# Patient Record
Sex: MALE | Race: WHITE | NOT HISPANIC OR LATINO | Employment: OTHER | URBAN - METROPOLITAN AREA
[De-identification: names, ages, dates, MRNs, and addresses within clinical notes are randomized per-mention and may not be internally consistent; named-entity substitution may affect disease eponyms.]

---

## 2017-09-07 PROBLEM — E11.9 NON-INSULIN DEPENDENT TYPE 2 DIABETES MELLITUS: Chronic | Status: ACTIVE | Noted: 2017-09-07

## 2018-05-02 PROBLEM — E55.9 VITAMIN D DEFICIENCY: Chronic | Status: ACTIVE | Noted: 2018-05-02

## 2018-05-16 PROBLEM — Z98.890 S/P COLONOSCOPY: Chronic | Status: ACTIVE | Noted: 2018-05-16

## 2018-10-02 ENCOUNTER — OFFICE VISIT (OUTPATIENT)
Dept: ORTHOPEDICS | Facility: CLINIC | Age: 78
End: 2018-10-02
Payer: MEDICARE

## 2018-10-02 ENCOUNTER — TELEPHONE (OUTPATIENT)
Dept: ORTHOPEDICS | Facility: CLINIC | Age: 78
End: 2018-10-02

## 2018-10-02 VITALS — WEIGHT: 168 LBS | BODY MASS INDEX: 24.88 KG/M2 | HEIGHT: 69 IN

## 2018-10-02 DIAGNOSIS — M17.0 PRIMARY OSTEOARTHRITIS OF BOTH KNEES: Primary | ICD-10-CM

## 2018-10-02 PROCEDURE — 73564 X-RAY EXAM KNEE 4 OR MORE: CPT | Mod: 50,FY,, | Performed by: ORTHOPAEDIC SURGERY

## 2018-10-02 PROCEDURE — 99214 OFFICE O/P EST MOD 30 MIN: CPT | Mod: 25,,, | Performed by: ORTHOPAEDIC SURGERY

## 2018-10-02 NOTE — PROGRESS NOTES
Subjective:       Chief Complaint    Chief Complaint   Patient presents with    Knee Pain     Bilateral Knee pain x 1 year. Pt states that he has had knee pain for many years when he played football. He states that it started getting progressively worse approximately 1 year ago. Pt had Supartz injection in Left knee 3/3/2015 Pt states that he had Suparts injection in [place] knees. Pt states that Supartz worked well for him. He would like to repeat the injection.       HPI  Kiran Stroud is a 78 y.o.  male who presents with bilateral primary osteoarthritis in both knees. Had a patellectomy in the left knee done by Dr. Dakota Dorado. He has has Supartz in the past, which she states helps quite a bit. Would like to restart that again.      Past History  Past Medical History:   Diagnosis Date    Cancer     Diabetes mellitus type II     HTN (hypertension)     Hyperlipidemia     Hypothyroidism     Renal insufficiency     Thyroid disease      Past Surgical History:   Procedure Laterality Date    APPENDECTOMY      CHILDHOOD    CHOLECYSTECTOMY      TOURO    KNEE CARTILAGE SURGERY      BILATERAL    PROSTATE REMOVED  1995    CANCER     Social History     Socioeconomic History    Marital status:      Spouse name: Not on file    Number of children: Not on file    Years of education: Not on file    Highest education level: Not on file   Social Needs    Financial resource strain: Not on file    Food insecurity - worry: Not on file    Food insecurity - inability: Not on file    Transportation needs - medical: Not on file    Transportation needs - non-medical: Not on file   Occupational History    Occupation: insurance     Employer: [employer]   Tobacco Use    Smoking status: Former Smoker    Smokeless tobacco: Never Used    Tobacco comment: QUIT YEARS AGO   Substance and Sexual Activity    Alcohol use: No    Drug use: No    Sexual activity: Yes     Partners: Female   Other Topics Concern     Not on file   Social History Narrative    Not on file         Medications  Current Outpatient Medications   Medication Sig    amLODIPine (NORVASC) 10 MG tablet TAKE 1 TABLET BY MOUTH EVERY DAY    amLODIPine (NORVASC) 10 MG tablet TAKE 1 TABLET(10 MG) BY MOUTH EVERY DAY    aspirin (ECOTRIN) 81 MG EC tablet Take 81 mg by mouth once daily.    fenofibrate (TRICOR) 145 MG tablet TAKE 1 TABLET BY MOUTH ONCE DAILY    JANUVIA 50 mg Tab TAKE 1 TABLET BY MOUTH EVERY DAY    levothyroxine (SYNTHROID) 200 MCG tablet TAKE 1 TABLET BY MOUTH BEFORE BREAKFAST    losartan (COZAAR) 50 MG tablet Take 1 tablet (50 mg total) by mouth once daily.    niacin (SLO-NIACIN) 500 mg tablet TAKE 1 TABLET(500 MG) BY MOUTH EVERY DAY    oxybutynin (DITROPAN-XL) 10 MG 24 hr tablet TAKE 1 TABLET DAILY    simvastatin (ZOCOR) 20 MG tablet Take 1 tablet (20 mg total) by mouth every evening.    vitamin D 1000 units Tab Take 1 tablet (1,000 Units total) by mouth once daily.    fenofibrate (TRICOR) 145 MG tablet Take 1 tablet (145 mg total) by mouth once daily.    fluticasone (FLONASE) 50 mcg/actuation nasal spray USE 1 SPRAY IN EACH NOSTRIL DAILY    FLUZONE HIGH-DOSE 2016-17, PF, 180 mcg/0.5 mL Syrg ADM 0.5ML IM UTD    FLUZONE HIGH-DOSE 2017-18, PF, 180 mcg/0.5 mL vaccine ADM 0.5ML IM UTD     No current facility-administered medications for this visit.        Allergies  Review of patient's allergies indicates:   Allergen Reactions    Sulfa (sulfonamide antibiotics)          Review of Systems     Constitutional: Negative    HENT: Negative  Eyes: Negative  Respiratory: Negative  Cardiovascular: Negative  Musculoskeletal: HPI  Skin: Negative  Neurological: Negative  Hematological: Negative  Endocrine: Negative      Physical Exam    There were no vitals filed for this visit.  Physical Examination: Right left knees demonstrate midline incisions. There has been a patellectomy on the left knee. Range of motion minus To 115°. Diffuse joint line  tenderness. Moderate patellofemoral crepitance at the right knee. Her vascular is intact. Knees are stable. The flexion is in the right knee.     Skin-clear  General appearance -  well appearing, and in no distress  Mental status - awake  Neck - supple  Chest -  symmetric air entry  Heart - normal rate   Abdomen - soft      Assessment/Plan   Primary osteoarthritis of both knees    Other orders  -     X-ray Knee Ortho Bilateral      Schedule bilateral Supartz injections for both knees.      This note was dictated using voice recognition software and may contain grammatical errors.

## 2018-10-16 ENCOUNTER — OFFICE VISIT (OUTPATIENT)
Dept: ORTHOPEDICS | Facility: CLINIC | Age: 78
End: 2018-10-16
Payer: MEDICARE

## 2018-10-16 VITALS — WEIGHT: 174 LBS | HEIGHT: 69 IN | BODY MASS INDEX: 25.77 KG/M2

## 2018-10-16 DIAGNOSIS — M17.0 PRIMARY OSTEOARTHRITIS OF BOTH KNEES: Primary | ICD-10-CM

## 2018-10-16 PROBLEM — R32 INCONTINENCE: Status: ACTIVE | Noted: 2018-10-16

## 2018-10-16 PROBLEM — S83.289A TEAR OF LATERAL MENISCUS OF KNEE: Status: ACTIVE | Noted: 2018-10-16

## 2018-10-16 PROCEDURE — 99499 UNLISTED E&M SERVICE: CPT | Mod: ,,, | Performed by: ORTHOPAEDIC SURGERY

## 2018-10-16 PROCEDURE — 20610 DRAIN/INJ JOINT/BURSA W/O US: CPT | Mod: 50,,, | Performed by: ORTHOPAEDIC SURGERY

## 2018-10-16 NOTE — PROCEDURES
Large Joint Aspiration/Injection: R knee, L knee  Date/Time: 10/16/2018 1:45 PM  Performed by: Juan Douglas Jr., MD  Authorized by: Juan Douglas Jr., MD     Consent Done?:  Yes (Verbal)  Indications:  Pain  Procedure site marked: Yes    Timeout: Prior to procedure the correct patient, procedure, and site was verified      Location:  Knee  Site:  R knee and L knee (joints)  Prep: Patient was prepped and draped in usual sterile fashion    Ultrasonic Guidance for needle placement: No  Needle size:  22 G  Approach:  Lateral  Medications:  25 mg sodium hyaluronate (supartz) 10 mg/mL; 25 mg sodium hyaluronate (supartz) 10 mg/mL  Patient tolerance:  Patient tolerated the procedure well with no immediate complications

## 2018-10-23 ENCOUNTER — OFFICE VISIT (OUTPATIENT)
Dept: ORTHOPEDICS | Facility: CLINIC | Age: 78
End: 2018-10-23
Payer: MEDICARE

## 2018-10-23 VITALS — BODY MASS INDEX: 25.77 KG/M2 | HEIGHT: 69 IN | WEIGHT: 174 LBS

## 2018-10-23 DIAGNOSIS — M17.0 PRIMARY OSTEOARTHRITIS OF BOTH KNEES: Primary | ICD-10-CM

## 2018-10-23 PROCEDURE — 99499 UNLISTED E&M SERVICE: CPT | Mod: ,,, | Performed by: ORTHOPAEDIC SURGERY

## 2018-10-23 PROCEDURE — 20610 DRAIN/INJ JOINT/BURSA W/O US: CPT | Mod: 50,,, | Performed by: ORTHOPAEDIC SURGERY

## 2018-10-23 NOTE — PROCEDURES
Large Joint Aspiration/Injection: R knee, L knee  Date/Time: 10/23/2018 1:24 PM  Performed by: Juan Douglas Jr., MD  Authorized by: Juna Douglas Jr., MD     Consent Done?:  Yes (Verbal)  Indications:  Pain  Procedure site marked: Yes    Timeout: Prior to procedure the correct patient, procedure, and site was verified      Location:  Knee  Site:  R knee and L knee (joints)  Prep: Patient was prepped and draped in usual sterile fashion    Ultrasonic Guidance for needle placement: No  Needle size:  22 G  Approach:  Lateral  Medications:  25 mg sodium hyaluronate (supartz) 10 mg/mL; 25 mg sodium hyaluronate (supartz) 10 mg/mL  Patient tolerance:  Patient tolerated the procedure well with no immediate complications

## 2018-10-30 ENCOUNTER — OFFICE VISIT (OUTPATIENT)
Dept: ORTHOPEDICS | Facility: CLINIC | Age: 78
End: 2018-10-30
Payer: MEDICARE

## 2018-10-30 VITALS — HEIGHT: 69 IN | BODY MASS INDEX: 25.77 KG/M2 | WEIGHT: 174 LBS

## 2018-10-30 DIAGNOSIS — M17.0 PRIMARY OSTEOARTHRITIS OF BOTH KNEES: Primary | ICD-10-CM

## 2018-10-30 PROCEDURE — 20610 DRAIN/INJ JOINT/BURSA W/O US: CPT | Mod: 50,,, | Performed by: ORTHOPAEDIC SURGERY

## 2018-10-30 PROCEDURE — 99499 UNLISTED E&M SERVICE: CPT | Mod: ,,, | Performed by: ORTHOPAEDIC SURGERY

## 2018-10-30 NOTE — PROCEDURES
Large Joint Aspiration/Injection: R knee, L knee  Date/Time: 10/30/2018 1:30 PM  Performed by: Juan Douglas Jr., MD  Authorized by: Juan Douglas Jr., MD     Consent Done?:  Yes (Verbal)  Indications:  Pain  Procedure site marked: Yes    Timeout: Prior to procedure the correct patient, procedure, and site was verified      Location:  Knee  Site:  R knee and L knee (joints)  Prep: Patient was prepped and draped in usual sterile fashion    Ultrasonic Guidance for needle placement: No  Needle size:  22 G  Approach:  Lateral  Medications:  25 mg sodium hyaluronate (supartz) 10 mg/mL; 25 mg sodium hyaluronate (supartz) 10 mg/mL  Patient tolerance:  Patient tolerated the procedure well with no immediate complications

## 2020-02-13 PROBLEM — R55 SYNCOPE: Status: ACTIVE | Noted: 2020-02-13

## 2020-02-17 ENCOUNTER — TELEPHONE (OUTPATIENT)
Dept: CARDIOLOGY | Facility: CLINIC | Age: 80
End: 2020-02-17

## 2020-02-17 NOTE — TELEPHONE ENCOUNTER
----- Message from Raleigh Giles sent at 2/17/2020 12:19 PM CST -----  Contact: Enrico (friend): [phone]  Called to get the pt a appt with Dr. Crespo, for a hospital follow up      Please contact Enrico (friend): [phone]

## 2020-02-19 ENCOUNTER — OFFICE VISIT (OUTPATIENT)
Dept: CARDIOLOGY | Facility: CLINIC | Age: 80
End: 2020-02-19
Payer: MEDICARE

## 2020-02-19 VITALS
DIASTOLIC BLOOD PRESSURE: 68 MMHG | SYSTOLIC BLOOD PRESSURE: 139 MMHG | WEIGHT: 162.81 LBS | HEART RATE: 72 BPM | BODY MASS INDEX: 24.04 KG/M2 | OXYGEN SATURATION: 95 %

## 2020-02-19 DIAGNOSIS — R55 SYNCOPE AND COLLAPSE: ICD-10-CM

## 2020-02-19 DIAGNOSIS — R07.9 CHRONIC CHEST PAIN: ICD-10-CM

## 2020-02-19 DIAGNOSIS — G89.29 CHRONIC CHEST PAIN: ICD-10-CM

## 2020-02-19 PROCEDURE — 99213 OFFICE O/P EST LOW 20 MIN: CPT | Mod: PBBFAC,PN | Performed by: INTERNAL MEDICINE

## 2020-02-19 PROCEDURE — 99999 PR PBB SHADOW E&M-EST. PATIENT-LVL III: ICD-10-PCS | Mod: PBBFAC,,, | Performed by: INTERNAL MEDICINE

## 2020-02-19 PROCEDURE — 99999 PR PBB SHADOW E&M-EST. PATIENT-LVL III: CPT | Mod: PBBFAC,,, | Performed by: INTERNAL MEDICINE

## 2020-02-19 PROCEDURE — 99204 OFFICE O/P NEW MOD 45 MIN: CPT | Mod: S$PBB,,, | Performed by: INTERNAL MEDICINE

## 2020-02-19 PROCEDURE — 99204 PR OFFICE/OUTPT VISIT, NEW, LEVL IV, 45-59 MIN: ICD-10-PCS | Mod: S$PBB,,, | Performed by: INTERNAL MEDICINE

## 2020-02-19 NOTE — ASSESSMENT & PLAN NOTE
The patient was admitted to the hospital for syncope.  Is not clear that he actually was completely unconscious.  The patient has had no episodes of syncope since he left the hospital.  He has never had a SPECT stress test so he is going to be scheduled for SPECT myocardial perfusion scan.  It is also encouraged that he check his blood sugar on a daily basis before eating.  He does not eat all the time and he is on medications for his diabetes.  May be that he became hypoglycemic.

## 2020-02-19 NOTE — PROGRESS NOTES
Subjective:    Patient ID:  Kiran Stroud is a 79 y.o. y.o. male who presents for initial visit for Hospital Follow Up      This is my 1st visit with this patient who fell while walking from his car couple of weeks ago.  I saw him in the hospital.  The patient has no history of cardiovascular issues.  He does not experiencing any arrhythmias.  He is a diabetic.  He is coming in with a neighbor who states that he does not eat regular.  There is also concern from his wife and from his neighbor that he has memory loss.  The patient continues to work as an .      Past Medical History:   Diagnosis Date    Cancer     Diabetes mellitus type II     HTN (hypertension)     Hyperlipidemia     Hypothyroidism     Renal insufficiency     Thyroid disease         Social History     Socioeconomic History    Marital status:      Spouse name: Not on file    Number of children: Not on file    Years of education: Not on file    Highest education level: Not on file   Occupational History    Occupation: insurance     Employer: [employer].   Social Needs    Financial resource strain: Not on file    Food insecurity:     Worry: Not on file     Inability: Not on file    Transportation needs:     Medical: Not on file     Non-medical: Not on file   Tobacco Use    Smoking status: Former Smoker    Smokeless tobacco: Never Used    Tobacco comment: QUIT YEARS AGO   Substance and Sexual Activity    Alcohol use: No    Drug use: No    Sexual activity: Yes     Partners: Female   Lifestyle    Physical activity:     Days per week: Not on file     Minutes per session: Not on file    Stress: Not on file   Relationships    Social connections:     Talks on phone: Not on file     Gets together: Not on file     Attends Hinduism service: Not on file     Active member of club or organization: Not on file     Attends meetings of clubs or organizations: Not on file     Relationship status: Not on file    Other Topics Concern    Not on file   Social History Narrative    Not on file        No family history on file.     Review of Systems   Constitutional: Negative.    HENT: Negative.    Eyes: Negative.    Respiratory: Negative.    Cardiovascular:        Recent syncopal episode an incomplete database on the cardiovascular system.   Gastrointestinal: Negative.    Genitourinary: Negative.    Musculoskeletal: Negative.    Skin: Negative.    Neurological: Negative.    Endo/Heme/Allergies: Negative.    Psychiatric/Behavioral: Negative.         Objective:     /68 (BP Location: Left arm, Patient Position: Sitting, BP Method: Medium (Automatic))   Pulse 72   Wt 73.9 kg (162 lb 13 oz)   SpO2 95%   BMI 24.04 kg/m²     Physical Exam   Constitutional: He is oriented to person, place, and time and well-developed, well-nourished, and in no distress.   HENT:   Head: Normocephalic and atraumatic.   Eyes: Pupils are equal, round, and reactive to light. No scleral icterus.   Neck: Normal range of motion. Neck supple. No thyromegaly present.   Cardiovascular: Normal rate, regular rhythm and normal heart sounds.   Pulmonary/Chest: Effort normal and breath sounds normal.   Abdominal: Soft. Bowel sounds are normal. He exhibits no distension and no mass. There is no tenderness.   Musculoskeletal: Normal range of motion. He exhibits no edema.   Lymphadenopathy:     He has no cervical adenopathy.   Neurological: He is alert and oriented to person, place, and time. No cranial nerve deficit.   Skin: Skin is warm and dry. No erythema.   Psychiatric: Memory, affect and judgment normal.       Labs:     Lab Results   Component Value Date     02/14/2020    K 3.7 02/14/2020     02/14/2020    CO2 23 02/14/2020    BUN 29 (H) 02/14/2020    CREATININE 1.8 (H) 02/14/2020    ANIONGAP 8 02/14/2020     Lab Results   Component Value Date    HGBA1C 5.8 (H) 01/20/2020     No results found for: BNP, BNPTRIAGEBLO    Lab Results    Component Value Date    WBC 4.90 02/13/2020    HGB 12.0 (L) 02/13/2020    HCT 35.6 (L) 02/13/2020     02/13/2020    GRAN 3.8 02/13/2020    GRAN 78.2 (H) 02/13/2020     Lab Results   Component Value Date    CHOL 178 01/22/2019    HDL 40 01/22/2019    LDLCALC 106 (H) 01/22/2019    TRIG 158 (H) 01/22/2019         Results for orders placed or performed during the hospital encounter of 02/13/20   EKG 12-lead    Collection Time: 02/13/20 12:56 PM    Narrative    Test Reason : R42,R55,    Vent. Rate : 083 BPM     Atrial Rate : 083 BPM     P-R Int : 162 ms          QRS Dur : 080 ms      QT Int : 366 ms       P-R-T Axes : 066 006 054 degrees     QTc Int : 430 ms    Normal sinus rhythm  Normal ECG  When compared with ECG of 25-MAY-1995 12:20,  No significant change was found  Confirmed by Zak OJEDA, Devon CONNORS ([id number]) on 2/17/2020 8:45:14 AM    Referred By: [email or web address]   SELF           Confirmed By:Devon Crespo MD        .  Meds:     Current Outpatient Medications:     aspirin (ECOTRIN) 81 MG EC tablet, Take 81 mg by mouth once daily., Disp: , Rfl:     donepezil (ARICEPT) 5 MG tablet, Take 1 tablet (5 mg total) by mouth nightly., Disp: 30 tablet, Rfl: 2    ergocalciferol (VITAMIN D2) 50,000 unit Cap, Take 1 capsule (50,000 Units total) by mouth every 7 days., Disp: 12 capsule, Rfl: 0    fenofibrate (TRICOR) 145 MG tablet, Take 1 tablet (145 mg total) by mouth once daily., Disp: 90 tablet, Rfl: 0    levothyroxine (SYNTHROID) 200 MCG tablet, Take 1 tablet (200 mcg total) by mouth before breakfast., Disp: 90 tablet, Rfl: 0    lisinopril (ZESTRIL) 2.5 MG tablet, Take 1 tablet (2.5 mg total) by mouth once daily., Disp: 90 tablet, Rfl: 0    memantine (NAMENDA) 5 MG Tab, TAKE 1 TABLET(5 MG) BY MOUTH TWICE DAILY, Disp: 180 tablet, Rfl: 0    niacin (SLO-NIACIN) 500 mg tablet, TAKE 1 TABLET(500 MG) BY MOUTH EVERY DAY, Disp: 15 tablet, Rfl: 0    NIFEdipine (PROCARDIA-XL) 30 MG (OSM) 24 hr tablet, Take 1 tablet (30 mg  total) by mouth once daily., Disp: 90 tablet, Rfl: 0    ONETOUCH DELICA LANCETS 33 gauge Misc, USE BID, Disp: , Rfl: 2    ONETOUCH ULTRA BLUE TEST STRIP Strp, TEST BID, Disp: , Rfl: 2    oxybutynin (DITROPAN-XL) 10 MG 24 hr tablet, Take 1 tablet (10 mg total) by mouth once daily., Disp: 90 tablet, Rfl: 0    PAIN RELIEVER 325 mg tablet, TK 1 T PO Q 4-6 H, Disp: , Rfl: 0    SENNA-S 8.6-50 mg per tablet, TAKE 2 TABLETS BY MOUTH DAILY AS NEEDED FOR CONSTIPATION, Disp: 90 tablet, Rfl: 0    simvastatin (ZOCOR) 20 MG tablet, Take 1 tablet (20 mg total) by mouth every evening., Disp: 90 tablet, Rfl: 0    SITagliptin (JANUVIA) 50 MG Tab, Take 1 tablet (50 mg total) by mouth once daily., Disp: 90 tablet, Rfl: 0      Assessment & Plan:     Syncope and collapse  The patient was admitted to the hospital for syncope.  Is not clear that he actually was completely unconscious.  The patient has had no episodes of syncope since he left the hospital.  He has never had a SPECT stress test so he is going to be scheduled for SPECT myocardial perfusion scan.  It is also encouraged that he check his blood sugar on a daily basis before eating.  He does not eat all the time and he is on medications for his diabetes.  May be that he became hypoglycemic.

## 2020-07-02 PROBLEM — R55 SYNCOPE AND COLLAPSE: Status: RESOLVED | Noted: 2020-02-13 | Resolved: 2020-07-02

## 2020-10-14 PROBLEM — I49.9 ARRHYTHMIA: Status: ACTIVE | Noted: 2020-10-14

## 2020-10-15 PROBLEM — I49.3 FREQUENT PVCS: Status: ACTIVE | Noted: 2020-10-15

## 2020-10-16 PROBLEM — N17.9 AKI (ACUTE KIDNEY INJURY): Status: ACTIVE | Noted: 2020-10-16

## 2020-10-20 ENCOUNTER — PATIENT OUTREACH (OUTPATIENT)
Dept: ADMINISTRATIVE | Facility: CLINIC | Age: 80
End: 2020-10-20

## 2020-10-20 PROBLEM — R00.1 SYMPTOMATIC BRADYCARDIA: Status: ACTIVE | Noted: 2020-10-20

## 2020-10-20 NOTE — PROGRESS NOTES
C3 nurse attempted to contact patient. No answer. The following message was left for the patient to return the call:  Good afternoon I am a nurse calling on behalf of [facility] from the [facility].  This is a Transitional Care Call for Kiran. When you have a moment please contact us at [phone] or [phone] Monday through Friday, between the hours of 8 am to 4 pm. We look forward to speaking with you. On behalf of [facility] have a nice day.    The patient does not have a scheduled HOSFU appointment within 7-14 days post hospital discharge date 10/18/20. Non [facility] PCP.

## 2020-10-22 ENCOUNTER — NURSE TRIAGE (OUTPATIENT)
Dept: ADMINISTRATIVE | Facility: CLINIC | Age: 80
End: 2020-10-22

## 2020-10-22 NOTE — TELEPHONE ENCOUNTER
Caller states that pt is confused, not as alert as normal, difficulty to awake s/p discharge from hospital post pacemaker placement.  Pt advised per protocol and verbalized understanding.    Reason for Disposition   Difficult to awaken or acting confused (e.g., disoriented, slurred speech)    Additional Information   Negative: SEVERE difficulty breathing (e.g., struggling for each breath, speaks in single words)   Negative: Shock suspected (e.g., cold/pale/clammy skin, too weak to stand)    Protocols used: ST WEAKNESS (GENERALIZED) AND FATIGUE-A-OH

## 2020-10-26 PROBLEM — R41.82 ALTERED MENTAL STATUS: Status: ACTIVE | Noted: 2020-10-26

## 2020-10-28 PROBLEM — R41.82 ALTERED MENTAL STATUS: Status: RESOLVED | Noted: 2020-10-26 | Resolved: 2020-10-28

## 2020-11-02 PROBLEM — Z95.0 CARDIAC PACEMAKER IN SITU: Status: ACTIVE | Noted: 2020-11-02

## 2020-11-02 PROBLEM — Z87.898 HISTORY OF SYNCOPE: Status: ACTIVE | Noted: 2020-11-02

## 2020-12-03 PROBLEM — Z87.898 HISTORY OF SYNCOPE: Status: RESOLVED | Noted: 2020-11-02 | Resolved: 2020-12-03

## 2021-04-06 PROBLEM — F02.80 ALZHEIMER'S DISEASE: Chronic | Status: ACTIVE | Noted: 2021-04-06

## 2021-04-06 PROBLEM — G30.9 ALZHEIMER'S DISEASE: Chronic | Status: ACTIVE | Noted: 2021-04-06

## 2021-04-07 PROBLEM — F51.01 PRIMARY INSOMNIA: Chronic | Status: ACTIVE | Noted: 2021-04-07

## 2021-08-26 ENCOUNTER — EXTERNAL HOME HEALTH (OUTPATIENT)
Dept: HOME HEALTH SERVICES | Facility: HOSPITAL | Age: 81
End: 2021-08-26

## 2021-09-27 ENCOUNTER — DOCUMENT SCAN (OUTPATIENT)
Dept: HOME HEALTH SERVICES | Facility: HOSPITAL | Age: 81
End: 2021-09-27

## 2021-09-29 ENCOUNTER — DOCUMENT SCAN (OUTPATIENT)
Dept: HOME HEALTH SERVICES | Facility: HOSPITAL | Age: 81
End: 2021-09-29